# Patient Record
Sex: MALE | Race: WHITE | Employment: OTHER | URBAN - METROPOLITAN AREA
[De-identification: names, ages, dates, MRNs, and addresses within clinical notes are randomized per-mention and may not be internally consistent; named-entity substitution may affect disease eponyms.]

---

## 2017-01-17 ENCOUNTER — TELEPHONE (OUTPATIENT)
Dept: SURGERY | Age: 57
End: 2017-01-17

## 2017-01-18 ENCOUNTER — ANTI-COAG VISIT (OUTPATIENT)
Dept: SURGERY | Age: 57
End: 2017-01-18

## 2017-01-18 DIAGNOSIS — Z86.718 HISTORY OF DVT (DEEP VEIN THROMBOSIS): ICD-10-CM

## 2017-01-18 DIAGNOSIS — Z79.01 LONG TERM CURRENT USE OF ANTICOAGULANT: Primary | ICD-10-CM

## 2017-02-22 ENCOUNTER — TELEPHONE (OUTPATIENT)
Dept: SURGERY | Age: 57
End: 2017-02-22

## 2017-02-22 DIAGNOSIS — Z79.01 LONG TERM CURRENT USE OF ANTICOAGULANT: ICD-10-CM

## 2017-02-22 DIAGNOSIS — Z86.718 HISTORY OF DVT (DEEP VEIN THROMBOSIS): Primary | ICD-10-CM

## 2017-02-22 LAB
INR BLD: 2.2 (ref 0.9–1.1)
PROTHROMBIN TIME: 24.7 SECONDS (ref 9.4–12.6)

## 2017-02-24 ENCOUNTER — ANTI-COAG VISIT (OUTPATIENT)
Dept: SURGERY | Age: 57
End: 2017-02-24

## 2017-03-27 LAB
INR BLD: 2.1 (ref 0.9–1.1)
PROTHROMBIN TIME: 23.5 SECONDS (ref 9.4–12.6)

## 2017-03-28 ENCOUNTER — ANTI-COAG VISIT (OUTPATIENT)
Dept: SURGERY | Age: 57
End: 2017-03-28

## 2017-04-24 ENCOUNTER — TELEPHONE (OUTPATIENT)
Dept: SURGERY | Age: 57
End: 2017-04-24

## 2017-04-25 ENCOUNTER — ANTI-COAG VISIT (OUTPATIENT)
Dept: SURGERY | Age: 57
End: 2017-04-25

## 2017-05-30 ENCOUNTER — TELEPHONE (OUTPATIENT)
Dept: SURGERY | Age: 57
End: 2017-05-30

## 2017-05-31 ENCOUNTER — ANTI-COAG VISIT (OUTPATIENT)
Dept: SURGERY | Age: 57
End: 2017-05-31

## 2017-07-05 ENCOUNTER — TELEPHONE (OUTPATIENT)
Dept: SURGERY | Age: 57
End: 2017-07-05

## 2017-07-10 ENCOUNTER — ANTI-COAG VISIT (OUTPATIENT)
Dept: SURGERY | Age: 57
End: 2017-07-10

## 2017-07-14 RX ORDER — WARFARIN SODIUM 5 MG/1
TABLET ORAL
Qty: 30 TABLET | Refills: 6 | Status: SHIPPED | OUTPATIENT
Start: 2017-07-14 | End: 2018-03-20 | Stop reason: SDUPTHER

## 2017-08-09 ENCOUNTER — OFFICE VISIT (OUTPATIENT)
Dept: SURGERY | Age: 57
End: 2017-08-09

## 2017-08-09 VITALS
BODY MASS INDEX: 26.04 KG/M2 | WEIGHT: 186 LBS | SYSTOLIC BLOOD PRESSURE: 106 MMHG | HEIGHT: 71 IN | HEART RATE: 80 BPM | DIASTOLIC BLOOD PRESSURE: 73 MMHG

## 2017-08-09 DIAGNOSIS — Z86.718 HISTORY OF DVT (DEEP VEIN THROMBOSIS): Primary | ICD-10-CM

## 2017-08-09 DIAGNOSIS — Z95.828 PRESENCE OF IVC FILTER: ICD-10-CM

## 2017-08-09 DIAGNOSIS — Z86.711 HISTORY OF PULMONARY EMBOLUS (PE): ICD-10-CM

## 2017-08-09 DIAGNOSIS — Z79.01 LONG TERM CURRENT USE OF ANTICOAGULANT: ICD-10-CM

## 2017-08-09 DIAGNOSIS — R56.9 SEIZURES (HCC): ICD-10-CM

## 2017-08-09 PROCEDURE — 99213 OFFICE O/P EST LOW 20 MIN: CPT | Performed by: SURGERY

## 2017-08-09 RX ORDER — DIVALPROEX SODIUM 250 MG/1
500 TABLET, DELAYED RELEASE ORAL 3 TIMES DAILY
Qty: 90 TABLET | Refills: 3 | Status: SHIPPED | OUTPATIENT
Start: 2017-08-09

## 2017-08-09 ASSESSMENT — ENCOUNTER SYMPTOMS
WHEEZING: 0
CHEST TIGHTNESS: 0
SHORTNESS OF BREATH: 0

## 2017-09-11 ENCOUNTER — TELEPHONE (OUTPATIENT)
Dept: SURGERY | Age: 57
End: 2017-09-11

## 2017-09-11 LAB
INR BLD: 2.8 (ref 0.9–1.1)
PROTHROMBIN TIME: 31.8 SECONDS (ref 9.4–12.6)

## 2017-09-12 ENCOUNTER — ANTI-COAG VISIT (OUTPATIENT)
Dept: SURGERY | Age: 57
End: 2017-09-12

## 2017-10-13 ENCOUNTER — TELEPHONE (OUTPATIENT)
Dept: SURGERY | Age: 57
End: 2017-10-13

## 2017-10-13 NOTE — TELEPHONE ENCOUNTER
Spoke with pt and he stated that he had blood taken at [facility] on [address]. I will call and see if I can get the results. Phone number [phone] and asked if they could fax results over to the office. Spoke with Domingo and she stated that could take up to 24 hours to receive and process, advised patient that we will get results sometime over the weekend and will call Monday with updated PT/INR results.

## 2017-10-17 ENCOUNTER — ANTI-COAG VISIT (OUTPATIENT)
Dept: SURGERY | Age: 57
End: 2017-10-17

## 2017-10-17 NOTE — TELEPHONE ENCOUNTER
Received results and left message advising patient to stay the same on dose and to repeat PT/INR in 4 weeks.

## 2017-11-21 ENCOUNTER — TELEPHONE (OUTPATIENT)
Dept: SURGERY | Age: 57
End: 2017-11-21

## 2017-11-22 ENCOUNTER — ANTI-COAG VISIT (OUTPATIENT)
Dept: SURGERY | Age: 57
End: 2017-11-22

## 2018-01-02 ENCOUNTER — TELEPHONE (OUTPATIENT)
Dept: SURGERY | Age: 58
End: 2018-01-02

## 2018-01-03 ENCOUNTER — ANTI-COAG VISIT (OUTPATIENT)
Dept: SURGERY | Age: 58
End: 2018-01-03

## 2018-01-19 ENCOUNTER — ANTI-COAG VISIT (OUTPATIENT)
Dept: SURGERY | Age: 58
End: 2018-01-19

## 2018-01-19 ENCOUNTER — TELEPHONE (OUTPATIENT)
Dept: SURGERY | Age: 58
End: 2018-01-19

## 2018-02-19 ENCOUNTER — TELEPHONE (OUTPATIENT)
Dept: SURGERY | Age: 58
End: 2018-02-19

## 2018-02-20 ENCOUNTER — ANTI-COAG VISIT (OUTPATIENT)
Dept: SURGERY | Age: 58
End: 2018-02-20

## 2018-03-16 ENCOUNTER — ANTI-COAG VISIT (OUTPATIENT)
Dept: SURGERY | Age: 58
End: 2018-03-16

## 2018-03-19 ENCOUNTER — TELEPHONE (OUTPATIENT)
Dept: SURGERY | Age: 58
End: 2018-03-19

## 2018-03-19 DIAGNOSIS — Z86.711 HISTORY OF PULMONARY EMBOLUS (PE): Primary | ICD-10-CM

## 2018-03-19 DIAGNOSIS — Z86.718 HISTORY OF DVT (DEEP VEIN THROMBOSIS): ICD-10-CM

## 2018-03-19 DIAGNOSIS — Z79.01 LONG TERM CURRENT USE OF ANTICOAGULANT: ICD-10-CM

## 2018-03-20 RX ORDER — WARFARIN SODIUM 5 MG/1
TABLET ORAL
Qty: 30 TABLET | Refills: 5 | Status: SHIPPED | OUTPATIENT
Start: 2018-03-20 | End: 2018-10-22 | Stop reason: SDUPTHER

## 2018-04-17 ENCOUNTER — ANTI-COAG VISIT (OUTPATIENT)
Dept: SURGERY | Age: 58
End: 2018-04-17

## 2018-04-17 LAB
INR BLD: 2.1 (ref 0.9–1.1)
PROTHROMBIN TIME: 23.5 SECONDS (ref 9.4–12.6)

## 2018-05-17 ENCOUNTER — TELEPHONE (OUTPATIENT)
Dept: SURGERY | Age: 58
End: 2018-05-17

## 2018-05-17 LAB
INR BLD: 2.5 (ref 0.9–1.1)
PROTHROMBIN TIME: 27.9 SECONDS (ref 9.4–12.6)

## 2018-05-22 ENCOUNTER — ANTI-COAG VISIT (OUTPATIENT)
Dept: SURGERY | Age: 58
End: 2018-05-22

## 2018-06-19 ENCOUNTER — TELEPHONE (OUTPATIENT)
Dept: SURGERY | Age: 58
End: 2018-06-19

## 2018-06-20 ENCOUNTER — ANTI-COAG VISIT (OUTPATIENT)
Dept: BREAST CENTER | Age: 58
End: 2018-06-20

## 2018-07-30 ENCOUNTER — TELEPHONE (OUTPATIENT)
Dept: SURGERY | Age: 58
End: 2018-07-30

## 2018-07-31 ENCOUNTER — ANTI-COAG VISIT (OUTPATIENT)
Dept: SURGERY | Age: 58
End: 2018-07-31

## 2018-08-08 ENCOUNTER — OFFICE VISIT (OUTPATIENT)
Dept: SURGERY | Age: 58
End: 2018-08-08

## 2018-08-08 VITALS
WEIGHT: 198 LBS | SYSTOLIC BLOOD PRESSURE: 123 MMHG | BODY MASS INDEX: 27.62 KG/M2 | HEART RATE: 74 BPM | DIASTOLIC BLOOD PRESSURE: 77 MMHG

## 2018-08-08 DIAGNOSIS — Z86.718 HISTORY OF DVT (DEEP VEIN THROMBOSIS): ICD-10-CM

## 2018-08-08 DIAGNOSIS — R56.9 SEIZURES (HCC): ICD-10-CM

## 2018-08-08 DIAGNOSIS — Z95.828 PRESENCE OF IVC FILTER: ICD-10-CM

## 2018-08-08 DIAGNOSIS — Z79.01 LONG TERM CURRENT USE OF ANTICOAGULANT: Primary | ICD-10-CM

## 2018-08-08 PROCEDURE — 99213 OFFICE O/P EST LOW 20 MIN: CPT | Performed by: SURGERY

## 2018-08-08 ASSESSMENT — ENCOUNTER SYMPTOMS
BLOOD IN STOOL: 0
ANAL BLEEDING: 0

## 2018-08-08 NOTE — PROGRESS NOTES
Subjective:      Patient ID: Scott Richardson is a 62 y.o. male. Chief Complaint   Patient presents with    1 Year Follow Up     Patient presents for one year office visit. He is follow here for history of DVT and coumadin therapy       HPI  He is doing well from the standpoint of his legs. No swelling in his legs. No need to wear compression stockings  SimonNitinol IVC filter in place. Coumadin use going along uneventfully. INR remains in therapeutic range. No bleeding of note  No additional seizure activity    Review of Systems   HENT: Negative for nosebleeds. Gastrointestinal: Negative for anal bleeding and blood in stool. Genitourinary: Negative for hematuria. Hematological: Does not bruise/bleed easily. Objective:   Physical Exam   Constitutional: He appears well-developed and well-nourished. HENT:   Head: Normocephalic. Neck: Neck supple. Cardiovascular: Normal rate, regular rhythm, S1 normal, normal heart sounds and intact distal pulses. Pulses:       Carotid pulses are 2+ on the right side, and 2+ on the left side. Radial pulses are 2+ on the right side, and 2+ on the left side. Femoral pulses are 2+ on the right side, and 2+ on the left side. Popliteal pulses are 2+ on the right side, and 2+ on the left side. Dorsalis pedis pulses are 2+ on the right side, and 2+ on the left side. Posterior tibial pulses are 2+ on the right side, and 2+ on the left side. Pulmonary/Chest: No respiratory distress. He has no wheezes. He has no rales. Abdominal: He exhibits no distension and no mass. There is no tenderness. There is no guarding. Musculoskeletal: He exhibits no edema. Left shoulder: He exhibits decreased range of motion and tenderness. Left lower leg: He exhibits swelling (minimal swelling ). Neurological: No cranial nerve deficit. Coordination normal.   Skin: Skin is warm. Bruising noted.  Ecchymosis: right forearm from blood draw.   Left leg with pigmentation changes   Psychiatric:   Flat affect  Slow soft speech   Nursing note and vitals reviewed. Assessment:       Diagnosis Orders   1. Long term current use of anticoagulant     2. History of DVT (deep vein thrombosis)     3. Presence of IVC filter     4. Seizures ([address]             Plan: You are doing very well from the standpoint of your previous blood clots  Stay on same medications  Walk as much as possible  Continue blood tests to check on the blood thinning.   Follow up in one year        Elijah Zarate MD

## 2018-08-28 ENCOUNTER — TELEPHONE (OUTPATIENT)
Dept: SURGERY | Age: 58
End: 2018-08-28

## 2018-08-28 ENCOUNTER — ANTI-COAG VISIT (OUTPATIENT)
Dept: SURGERY | Age: 58
End: 2018-08-28

## 2018-08-28 LAB
INR BLD: 2.7 (ref 0.9–1.1)
PROTHROMBIN TIME: 31.7 SECONDS (ref 9.8–13.2)

## 2018-08-28 NOTE — TELEPHONE ENCOUNTER
Spoke with LRR and he stated to stay on same dose and repeat in 1 month. Spoke with patient and advised.

## 2018-08-31 ENCOUNTER — ANTI-COAG VISIT (OUTPATIENT)
Dept: BREAST CENTER | Age: 58
End: 2018-08-31

## 2018-10-02 ENCOUNTER — TELEPHONE (OUTPATIENT)
Dept: SURGERY | Age: 58
End: 2018-10-02

## 2018-10-02 LAB
INR BLD: 3 (ref 0.9–1.1)
PROTHROMBIN TIME: 34.7 SECONDS (ref 9.8–13.2)

## 2018-10-08 ENCOUNTER — ANTI-COAG VISIT (OUTPATIENT)
Dept: SURGERY | Age: 58
End: 2018-10-08

## 2018-10-08 NOTE — TELEPHONE ENCOUNTER
Patient has been notified per Dr. Kye Aguilar to stay on same coumadin dosing 5 mg  and repeat in 4 weeks

## 2018-10-25 RX ORDER — WARFARIN SODIUM 5 MG/1
TABLET ORAL
Qty: 30 TABLET | Refills: 5 | Status: SHIPPED | OUTPATIENT
Start: 2018-10-25 | End: 2019-05-29 | Stop reason: SDUPTHER

## 2018-11-05 ENCOUNTER — TELEPHONE (OUTPATIENT)
Dept: SURGERY | Age: 58
End: 2018-11-05

## 2018-11-06 ENCOUNTER — ANTI-COAG VISIT (OUTPATIENT)
Dept: SURGERY | Age: 58
End: 2018-11-06

## 2018-11-06 NOTE — TELEPHONE ENCOUNTER
Spoke with LRR and he stated same dose and repeat PT/INR in 4 weeks. Spoke with patient and advised.

## 2018-12-10 ENCOUNTER — ANTI-COAG VISIT (OUTPATIENT)
Dept: BREAST CENTER | Age: 58
End: 2018-12-10

## 2018-12-26 ENCOUNTER — ANTI-COAG VISIT (OUTPATIENT)
Dept: SURGERY | Age: 58
End: 2018-12-26

## 2018-12-26 ENCOUNTER — TELEPHONE (OUTPATIENT)
Dept: SURGERY | Age: 58
End: 2018-12-26

## 2018-12-26 NOTE — TELEPHONE ENCOUNTER
INR therapeutic. Stay on same dose of Coumadin  and repeat Protime/INR in 4 weeks, per Dr. Shann Councilman.  The patient has been notified as of 12/26 at 9:30 am

## 2019-05-15 ENCOUNTER — ANTI-COAG VISIT (OUTPATIENT)
Dept: SURGERY | Age: 59
End: 2019-05-15

## 2019-05-30 RX ORDER — WARFARIN SODIUM 5 MG/1
TABLET ORAL
Qty: 30 TABLET | Refills: 5 | Status: SHIPPED | OUTPATIENT
Start: 2019-05-30

## 2019-10-03 ENCOUNTER — HOSPITAL ENCOUNTER (OUTPATIENT)
Dept: MRI IMAGING | Age: 59
Discharge: HOME | End: 2019-10-03
Payer: MEDICARE

## 2019-10-03 DIAGNOSIS — G40.309 GENERALIZED NONCONVULSIVE EPILEPSY (HCC): ICD-10-CM

## 2019-10-03 DIAGNOSIS — R41.89 COGNITIVE IMPAIRMENT: ICD-10-CM

## 2019-10-03 PROCEDURE — 70553 MRI BRAIN STEM W/O & W/DYE: CPT

## 2019-10-03 PROCEDURE — A9577 INJ MULTIHANCE: HCPCS | Performed by: PSYCHIATRY & NEUROLOGY

## 2019-10-03 PROCEDURE — 6360000004 HC RX CONTRAST MEDICATION: Performed by: PSYCHIATRY & NEUROLOGY

## 2019-10-03 RX ADMIN — GADOBENATE DIMEGLUMINE 15 ML: 529 INJECTION, SOLUTION INTRAVENOUS at 14:26

## 2025-04-20 ENCOUNTER — APPOINTMENT (OUTPATIENT)
Dept: GENERAL RADIOLOGY | Age: 65
End: 2025-04-20
Payer: COMMERCIAL

## 2025-04-20 ENCOUNTER — HOSPITAL ENCOUNTER (EMERGENCY)
Age: 65
Discharge: HOME | End: 2025-04-20
Payer: COMMERCIAL

## 2025-04-20 VITALS
RESPIRATION RATE: 18 BRPM | BODY MASS INDEX: 25.77 KG/M2 | WEIGHT: 184.08 LBS | HEIGHT: 71 IN | HEART RATE: 82 BPM | DIASTOLIC BLOOD PRESSURE: 78 MMHG | OXYGEN SATURATION: 99 % | TEMPERATURE: 98.1 F | SYSTOLIC BLOOD PRESSURE: 155 MMHG

## 2025-04-20 DIAGNOSIS — S22.31XA CLOSED FRACTURE OF ONE RIB OF RIGHT SIDE, INITIAL ENCOUNTER: Primary | ICD-10-CM

## 2025-04-20 DIAGNOSIS — S52.121A CLOSED DISPLACED FRACTURE OF HEAD OF RIGHT RADIUS, INITIAL ENCOUNTER: ICD-10-CM

## 2025-04-20 PROCEDURE — 71046 X-RAY EXAM CHEST 2 VIEWS: CPT

## 2025-04-20 PROCEDURE — 73070 X-RAY EXAM OF ELBOW: CPT

## 2025-04-20 PROCEDURE — 99283 EMERGENCY DEPT VISIT LOW MDM: CPT

## 2025-04-20 PROCEDURE — 73030 X-RAY EXAM OF SHOULDER: CPT

## 2025-04-20 PROCEDURE — 6370000000 HC RX 637 (ALT 250 FOR IP): Performed by: PHYSICIAN ASSISTANT

## 2025-04-20 PROCEDURE — 73110 X-RAY EXAM OF WRIST: CPT

## 2025-04-20 RX ORDER — ACETAMINOPHEN 500 MG
500 TABLET ORAL 4 TIMES DAILY PRN
Qty: 20 TABLET | Refills: 0 | Status: SHIPPED | OUTPATIENT
Start: 2025-04-20

## 2025-04-20 RX ORDER — ACETAMINOPHEN 500 MG
500 TABLET ORAL ONCE
Status: COMPLETED | OUTPATIENT
Start: 2025-04-20 | End: 2025-04-20

## 2025-04-20 RX ORDER — ACETAMINOPHEN 500 MG
500 TABLET ORAL 4 TIMES DAILY PRN
Qty: 20 TABLET | Refills: 0 | Status: SHIPPED | OUTPATIENT
Start: 2025-04-20 | End: 2025-04-20

## 2025-04-20 RX ADMIN — ACETAMINOPHEN 500 MG: 500 TABLET ORAL at 19:16

## 2025-04-20 ASSESSMENT — PAIN DESCRIPTION - LOCATION: LOCATION: ARM

## 2025-04-20 ASSESSMENT — PAIN DESCRIPTION - FREQUENCY: FREQUENCY: CONTINUOUS

## 2025-04-20 ASSESSMENT — PAIN DESCRIPTION - ORIENTATION: ORIENTATION: RIGHT

## 2025-04-20 ASSESSMENT — PAIN DESCRIPTION - PAIN TYPE: TYPE: ACUTE PAIN

## 2025-04-20 ASSESSMENT — PAIN SCALES - GENERAL
PAINLEVEL_OUTOF10: 6
PAINLEVEL_OUTOF10: 5

## 2025-04-20 ASSESSMENT — PAIN - FUNCTIONAL ASSESSMENT: PAIN_FUNCTIONAL_ASSESSMENT: ACTIVITIES ARE NOT PREVENTED

## 2025-04-20 ASSESSMENT — PAIN DESCRIPTION - DESCRIPTORS: DESCRIPTORS: ACHING

## 2025-04-20 NOTE — ED TRIAGE NOTES
/Pt into ED from home c/o  pain to RUE, and R ribs after an unwitnessed fall x3 days prior. Pt fell while walking out of his house. Denies striking head, or loss of consciousness. Pt does take coumadin. Has not taken medications at home for pain prior to arrival

## 2025-04-21 ENCOUNTER — TELEPHONE (OUTPATIENT)
Dept: ORTHOPEDIC SURGERY | Age: 65
End: 2025-04-21

## 2025-04-21 NOTE — ED PROVIDER NOTES
[facility] EMERGENCY DEPARTMENT  EMERGENCY DEPARTMENT ENCOUNTER        Pt Name: Jaylen Riley  MRN: [id number]  Birthdate 1960  Date of evaluation: 4/20/2025  Provider: EUGENE Gates  PCP: Jabier Cadet APRN - CNP  Note Started: 9:36 PM EDT 4/20/25      ZOË. I have evaluated this patient.        CHIEF COMPLAINT       Chief Complaint   Patient presents with    Fall     /Pt into ED from home c/o  pain to RUE, and R ribs after an unwitnessed fall x3 days prior. Pt fell while walking out of his house. Denies striking head, or loss of consciousness.        HISTORY OF PRESENT ILLNESS: 1 or more Elements     History from : Patient and Family cousin at the bedside    Limitations to history : None    Jaylen Riley is a 64 y.o. male who presents accompanied by his cousin. He had a fall on Thursday. He tripped and landed on his arm and thinks he may have cracked a rib as well. He denies head or neck injury. He does take warfarin with history of PE DVT. He denies getting dizzy or chest pain prior to or after the fall. He is been trying to deal with the pain at home but stopping pain in the elbow so he told his cousin today prompted him to come to the ED. He is right-hand dominant. Has not taken anything for the pain that he rates at 6/10.    Nursing Notes were all reviewed and agreed with or any disagreements were addressed in the HPI.    REVIEW OF SYSTEMS :      Review of Systems   Constitutional:  Negative for fever.   Cardiovascular:  Positive for chest pain (rib pain).   Gastrointestinal:  Negative for abdominal pain and vomiting.   Musculoskeletal:  Positive for arthralgias. Negative for back pain, neck pain and neck stiffness.   Skin:  Negative for color change and wound.   Neurological:  Negative for weakness, numbness and headaches.   Psychiatric/Behavioral:  Negative for agitation, behavioral problems and confusion.        Positives and Pertinent negatives as per HPI.     SURGICAL HISTORY

## 2025-04-21 NOTE — ED NOTES

## 2025-04-21 NOTE — TELEPHONE ENCOUNTER
----- Message from Dr. Eugenio Douglas MD sent at 4/21/2025  9:35 AM EDT -----  F/u elbow fracture  ----- Message -----  From: Discharge Provider, Automatic  Sent: 4/21/2025   9:33 AM EDT  To: Eugenio Douglas MD